# Patient Record
Sex: MALE | Race: OTHER | RURAL
[De-identification: names, ages, dates, MRNs, and addresses within clinical notes are randomized per-mention and may not be internally consistent; named-entity substitution may affect disease eponyms.]

---

## 2022-02-24 ENCOUNTER — NEW PATIENT (OUTPATIENT)
Dept: RURAL CLINIC 1 | Facility: CLINIC | Age: 67
End: 2022-02-24

## 2022-02-24 DIAGNOSIS — E11.9: ICD-10-CM

## 2022-02-24 DIAGNOSIS — H40.013: ICD-10-CM

## 2022-02-24 DIAGNOSIS — H25.13: ICD-10-CM

## 2022-02-24 PROCEDURE — 92004 COMPRE OPH EXAM NEW PT 1/>: CPT

## 2022-02-24 ASSESSMENT — VISUAL ACUITY
OS_CC: 20/40-1
OU_CC: J5
OD_CC: 20/40-1

## 2022-02-24 ASSESSMENT — TONOMETRY
OS_IOP_MMHG: 17
OD_IOP_MMHG: 16

## 2022-03-03 ENCOUNTER — CONSULTATION/EVALUATION (OUTPATIENT)
Dept: RURAL CLINIC 1 | Facility: CLINIC | Age: 67
End: 2022-03-03

## 2022-03-03 DIAGNOSIS — E11.9: ICD-10-CM

## 2022-03-03 DIAGNOSIS — H25.13: ICD-10-CM

## 2022-03-03 DIAGNOSIS — H40.013: ICD-10-CM

## 2022-03-03 PROCEDURE — 92014 COMPRE OPH EXAM EST PT 1/>: CPT

## 2022-03-03 ASSESSMENT — VISUAL ACUITY
OS_CC: J5
OS_BAT: 20/50
OU_SC: J2
OD_PH: 20/40
OS_PH: 20/40-1
OD_CC: 20/50
OU_SC: 20/200
OS_CC: 20/50
OD_CC: J7
OD_SC: J2
OU_CC: J3
OU_CC: 20/40
OD_PAM: 20/40
OS_SC: 20/200
OD_BAT: 20/50
OS_AM: 20/40
OD_SC: 20/200
OS_SC: J2

## 2022-03-03 ASSESSMENT — TONOMETRY
OS_IOP_MMHG: 17
OD_IOP_MMHG: 17

## 2022-03-03 NOTE — PATIENT DISCUSSION
The patient feels that the cataract is significantly impacting daily activities and has elected cataract surgery. The risks, benefits, and alternatives to surgery were discussed. The patient elects to proceed with surgery OS first then OD.  Recommended Stand/Trad IOL OU.

## 2022-03-24 ENCOUNTER — PRE-OP/H&P (OUTPATIENT)
Dept: RURAL CLINIC 1 | Facility: CLINIC | Age: 67
End: 2022-03-24

## 2022-03-24 VITALS
HEART RATE: 79 BPM | WEIGHT: 235 LBS | BODY MASS INDEX: 36.88 KG/M2 | HEIGHT: 67 IN | DIASTOLIC BLOOD PRESSURE: 79 MMHG | SYSTOLIC BLOOD PRESSURE: 119 MMHG

## 2022-03-24 DIAGNOSIS — Z01.818: ICD-10-CM

## 2022-03-24 DIAGNOSIS — H25.13: ICD-10-CM

## 2022-03-24 PROCEDURE — 99499 UNLISTED E&M SERVICE: CPT

## 2022-03-24 NOTE — PATIENT DISCUSSION
Medical Clearance -Medical clearance done today -No outstanding concerns that would preclude surgery -Patient cleared to proceed with scheduled surgery.

## 2022-04-06 ENCOUNTER — POST-OP (OUTPATIENT)
Dept: RURAL CLINIC 1 | Facility: CLINIC | Age: 67
End: 2022-04-06

## 2022-04-06 DIAGNOSIS — Z96.1: ICD-10-CM

## 2022-04-06 PROCEDURE — 99024 POSTOP FOLLOW-UP VISIT: CPT

## 2022-04-06 ASSESSMENT — TONOMETRY: OS_IOP_MMHG: 18

## 2022-04-06 ASSESSMENT — VISUAL ACUITY: OS_SC: 20/40-2

## 2022-04-18 ENCOUNTER — POST OP/EVAL OF SECOND EYE (OUTPATIENT)
Dept: RURAL CLINIC 1 | Facility: CLINIC | Age: 67
End: 2022-04-18

## 2022-04-18 VITALS
HEART RATE: 71 BPM | BODY MASS INDEX: 36.57 KG/M2 | HEIGHT: 67 IN | WEIGHT: 233 LBS | SYSTOLIC BLOOD PRESSURE: 152 MMHG | DIASTOLIC BLOOD PRESSURE: 91 MMHG

## 2022-04-18 DIAGNOSIS — Z01.818: ICD-10-CM

## 2022-04-18 DIAGNOSIS — Z96.1: ICD-10-CM

## 2022-04-18 PROCEDURE — 99024 POSTOP FOLLOW-UP VISIT: CPT

## 2022-04-18 ASSESSMENT — VISUAL ACUITY
OD_SC: 20/400
OD_PAM: 20/20
OS_SC: 20/25
OD_SC: 20/30
OD_PH: 20/50
OD_BAT: 20/60
OD_CC: 20/60

## 2022-04-18 ASSESSMENT — TONOMETRY
OD_IOP_MMHG: 18
OS_IOP_MMHG: 18

## 2022-04-18 NOTE — PATIENT DISCUSSION
Medical clearance done today. No outstanding concerns that would preclude surgery. Patient cleared to proceed with scheduled surgery.

## 2022-04-20 ENCOUNTER — POST-OP (OUTPATIENT)
Dept: RURAL CLINIC 1 | Facility: CLINIC | Age: 67
End: 2022-04-20

## 2022-04-20 DIAGNOSIS — Z96.1: ICD-10-CM

## 2022-04-20 PROCEDURE — 99024 POSTOP FOLLOW-UP VISIT: CPT

## 2022-04-20 ASSESSMENT — TONOMETRY
OD_IOP_MMHG: 26
OS_IOP_MMHG: 19

## 2022-04-20 ASSESSMENT — VISUAL ACUITY
OD_SC: 20/60
OS_SC: 20/25

## 2022-04-20 NOTE — PATIENT DISCUSSION
Continue with post-operative drops until completed. Discussed warning symptoms of RD/infection with pt who understands to call/seek urgent evaluation if they occur. Patient to wear protective shield over operative eye when sleeping x 1 week. Recheck in 1 week. no

## 2022-04-28 ENCOUNTER — POST-OP (OUTPATIENT)
Dept: RURAL CLINIC 1 | Facility: CLINIC | Age: 67
End: 2022-04-28

## 2022-04-28 DIAGNOSIS — Z96.1: ICD-10-CM

## 2022-04-28 PROCEDURE — 99024 POSTOP FOLLOW-UP VISIT: CPT

## 2022-04-28 ASSESSMENT — VISUAL ACUITY
OD_SC: 20/40-1
OS_SC: 20/25

## 2022-04-28 ASSESSMENT — TONOMETRY
OD_IOP_MMHG: 17
OS_IOP_MMHG: 18

## 2022-11-17 ENCOUNTER — FOLLOW UP (OUTPATIENT)
Dept: RURAL CLINIC 1 | Facility: CLINIC | Age: 67
End: 2022-11-17

## 2022-11-17 DIAGNOSIS — E11.9: ICD-10-CM

## 2022-11-17 DIAGNOSIS — H40.013: ICD-10-CM

## 2022-11-17 DIAGNOSIS — Z96.1: ICD-10-CM

## 2022-11-17 PROCEDURE — 92014 COMPRE OPH EXAM EST PT 1/>: CPT

## 2022-11-17 ASSESSMENT — VISUAL ACUITY
OS_SC: 20/20-1
OD_SC: 20/50
OU_SC: 20/40

## 2022-11-17 ASSESSMENT — TONOMETRY
OD_IOP_MMHG: 15
OS_IOP_MMHG: 14

## 2023-08-21 ENCOUNTER — FOLLOW UP (OUTPATIENT)
Dept: RURAL CLINIC 1 | Facility: CLINIC | Age: 68
End: 2023-08-21

## 2023-08-21 DIAGNOSIS — Z96.1: ICD-10-CM

## 2023-08-21 DIAGNOSIS — H40.013: ICD-10-CM

## 2023-08-21 DIAGNOSIS — E11.9: ICD-10-CM

## 2023-08-21 PROCEDURE — 92083 EXTENDED VISUAL FIELD XM: CPT

## 2023-08-21 PROCEDURE — 99214 OFFICE O/P EST MOD 30 MIN: CPT

## 2023-08-21 ASSESSMENT — TONOMETRY
OD_IOP_MMHG: 16
OS_IOP_MMHG: 16

## 2023-08-21 ASSESSMENT — VISUAL ACUITY
OS_SC: 20/20-1
OD_PH: 20/25
OD_SC: 20/40-2

## 2024-02-13 ENCOUNTER — FOLLOW UP (OUTPATIENT)
Dept: RURAL CLINIC 1 | Facility: CLINIC | Age: 69
End: 2024-02-13

## 2024-02-13 DIAGNOSIS — Z96.1: ICD-10-CM

## 2024-02-13 DIAGNOSIS — H40.013: ICD-10-CM

## 2024-02-13 DIAGNOSIS — E11.9: ICD-10-CM

## 2024-02-13 PROCEDURE — 92133 CPTRZD OPH DX IMG PST SGM ON: CPT

## 2024-02-13 PROCEDURE — 92014 COMPRE OPH EXAM EST PT 1/>: CPT

## 2024-02-13 ASSESSMENT — VISUAL ACUITY
OU_SC: 20/20
OD_SC: 20/30-2
OS_SC: 20/25

## 2024-02-13 ASSESSMENT — TONOMETRY
OS_IOP_MMHG: 16
OD_IOP_MMHG: 16

## 2024-08-13 ENCOUNTER — FOLLOW UP (OUTPATIENT)
Dept: RURAL CLINIC 1 | Facility: CLINIC | Age: 69
End: 2024-08-13

## 2024-08-13 DIAGNOSIS — Z96.1: ICD-10-CM

## 2024-08-13 DIAGNOSIS — H40.013: ICD-10-CM

## 2024-08-13 DIAGNOSIS — E11.9: ICD-10-CM

## 2024-08-13 PROCEDURE — 99214 OFFICE O/P EST MOD 30 MIN: CPT

## 2024-08-13 PROCEDURE — 92083 EXTENDED VISUAL FIELD XM: CPT

## 2024-08-13 ASSESSMENT — TONOMETRY
OD_IOP_MMHG: 14
OS_IOP_MMHG: 14

## 2024-08-13 ASSESSMENT — VISUAL ACUITY
OU_SC: 20/20
OS_SC: 20/20
OD_SC: 20/40

## 2025-02-14 ENCOUNTER — FOLLOW UP (OUTPATIENT)
Age: 70
End: 2025-02-14

## 2025-02-14 DIAGNOSIS — H16.223: ICD-10-CM

## 2025-02-14 DIAGNOSIS — Z96.1: ICD-10-CM

## 2025-02-14 DIAGNOSIS — E11.9: ICD-10-CM

## 2025-02-14 DIAGNOSIS — H40.013: ICD-10-CM

## 2025-02-14 PROCEDURE — 92133 CPTRZD OPH DX IMG PST SGM ON: CPT

## 2025-02-14 PROCEDURE — 92014 COMPRE OPH EXAM EST PT 1/>: CPT

## 2025-08-12 ENCOUNTER — FOLLOW UP (OUTPATIENT)
Age: 70
End: 2025-08-12

## 2025-08-12 DIAGNOSIS — H40.013: ICD-10-CM

## 2025-08-12 DIAGNOSIS — Z96.1: ICD-10-CM

## 2025-08-12 DIAGNOSIS — H16.223: ICD-10-CM

## 2025-08-12 DIAGNOSIS — E11.9: ICD-10-CM

## 2025-08-12 PROCEDURE — 92083 EXTENDED VISUAL FIELD XM: CPT

## 2025-08-12 PROCEDURE — 99214 OFFICE O/P EST MOD 30 MIN: CPT
